# Patient Record
(demographics unavailable — no encounter records)

---

## 2017-08-27 NOTE — EMERGENCY DEPARTMENT REPORT
Entered by REGIS SANTIAGO, acting as scribe for PEDRO JONES NP.





- General


Chief complaint: Skin Rash


Stated complaint: SCALP IRRATATION


Time Seen by Provider: 08/27/17 13:00


Source: patient


Mode of arrival: Ambulatory


Limitations: No Limitations





- History of Present Illness


Initial comments: 





This is a 50-year-old nontoxic, well nourished in appearance, no acute signs of 

distress presents with scalp irritation intermittent for 3 years. Patient 

states she noticed fungus in her scalp and went to a dermatologists and was 

prescribed topical fungal cream with unchanged symptoms. Patient was suppose to 

follow-up with the dermatologist but lost her insurance and did not follow-up.  

She states she feels lumps and whelps in her scalp.  Patient also has a 

secondary complaint of headache intermittent for 3 years. Patient stated has 

been going to [facility] emergency rooms and [facility] providers but never 

received a scan of the head. Patient is requesting a CT scan to rule out any 

abnormality.  Patient described headache has intermittent aching diffisuly with 

level of 6/10. Patient describes headache as gradual onset. Patient denies 

chest pain, SOB, nausea, vomiting, blurry vision, trauma, back, nausea, vomiting

, fever, chills, numbness or tingling. Patient states allergies she does not 

have any allergies to Flagyl. Patient stated she started to vomit during taking 

Flagyl but staetd he was not from Flagyl and was from Iron medication. 


MD complaint: other (scalp irritation and headache)


-: Gradual, year(s) (3)


Location: head


Severity: moderate


Severity scale (0 -10): 6


Quality: aching


Consistency: intermittent


Improves with: none


Worsens with: none


Context: none


Associated symptoms: itching


Treatments Prior to Arrival: none





- Related Data


 Previous Rx's











 Medication  Instructions  Recorded  Last Taken  Type


 


Permethrin 5% [Acticin 5% CREAM] 1 applicatio TP ONCE #60 gram 12/20/13 06/16/ 14 Rx


 


Cefuroxime Axetil [Ceftin] 500 mg PO Q12H #20 tablet 07/11/14 Unknown Rx


 


Pantoprazole [Protonix TAB] 40 mg PO DAILY #30 tablet 07/11/14 Unknown Rx


 


Sucralfate [Carafate] 1 gm PO ACHS #30 tablet 07/11/14 Unknown Rx


 


Warfarin [Coumadin] 6 mg PO DAILY@1700 #30 tablet 07/11/14 Unknown Rx


 


oxyCODONE /ACETAMINOPHEN [Percocet 1 tab PO Q6HR PRN #24 tablet 07/11/14 

Unknown Rx





5/325]    


 


Ibuprofen [Motrin 600 MG tab] 600 mg PO Q8H PRN #30 tablet 08/27/17 Unknown Rx


 


Itraconazole [Onmel] 200 mg PO QDAY #7 tab 08/27/17 Unknown Rx











 Allergies











Allergy/AdvReac Type Severity Reaction Status Date / Time


 


metronidazole [From Flagyl] Allergy  Vomiting Verified 06/28/14 18:59


 


Metronidazole HCl Allergy  Vomiting Verified 10/20/13 04:26





[From Flagyl]     














Abscess Boil HPI





- HPI


Chief Complaint: Skin Rash


Stated Complaint: SCALP IRRATATION


Home Medications: 


 Previous Rx's











 Medication  Instructions  Recorded  Last Taken  Type


 


Permethrin 5% [Acticin 5% CREAM] 1 applicatio TP ONCE #60 gram 12/20/13 06/16/ 14 Rx


 


Cefuroxime Axetil [Ceftin] 500 mg PO Q12H #20 tablet 07/11/14 Unknown Rx


 


Pantoprazole [Protonix TAB] 40 mg PO DAILY #30 tablet 07/11/14 Unknown Rx


 


Sucralfate [Carafate] 1 gm PO ACHS #30 tablet 07/11/14 Unknown Rx


 


Warfarin [Coumadin] 6 mg PO DAILY@1700 #30 tablet 07/11/14 Unknown Rx


 


oxyCODONE /ACETAMINOPHEN [Percocet 1 tab PO Q6HR PRN #24 tablet 07/11/14 

Unknown Rx





5/325]    


 


Ibuprofen [Motrin 600 MG tab] 600 mg PO Q8H PRN #30 tablet 08/27/17 Unknown Rx


 


Itraconazole [Onmel] 200 mg PO QDAY #7 tab 08/27/17 Unknown Rx











Allergies/Adverse Reactions: 


 Allergies











Allergy/AdvReac Type Severity Reaction Status Date / Time


 


metronidazole [From Flagyl] Allergy  Vomiting Verified 06/28/14 18:59


 


Metronidazole HCl Allergy  Vomiting Verified 10/20/13 04:26





[From Flagyl]     














ED Review of Systems


Comment: All other systems reviewed and negative


Constitutional: denies: chills, fever, weakness


Eyes: denies: eye pain, eye discharge, vision change


ENT: denies: ear pain, throat pain


Respiratory: denies: cough, shortness of breath, wheezing


Cardiovascular: denies: chest pain, palpitations


Endocrine: no symptoms reported


Gastrointestinal: denies: abdominal pain, nausea, vomiting, diarrhea


Genitourinary: denies: urgency, dysuria, discharge


Musculoskeletal: denies: back pain, joint swelling, arthralgia


Skin: rash (scalp irritation with circular crusting and scaling.).  denies: 

lesions, change in color, change in hair/nails


Neurological: headache.  denies: weakness, numbness, paresthesias


Psychiatric: denies: anxiety, depression


Hematological/Lymphatic: denies: easy bleeding, easy bruising





ED Past Medical Hx





- Past Medical History


Hx Hypertension: No


Hx Congestive Heart Failure: No


Hx Diabetes: No


Hx Deep Vein Thrombosis: Yes


Hx Pulmonary Embolism: Yes


Hx Liver Disease: No


Hx Renal Disease: No


Hx Seizures: No


Hx Asthma: No


Hx COPD: No


Additional medical history: DVT





- Surgical History


Additional Surgical History: csection tubal ligation





- Social History


Smoking Status: Never Smoker


Substance Use Type: None





- Medications


Home Medications: 


 Home Medications











 Medication  Instructions  Recorded  Confirmed  Last Taken  Type


 


Permethrin 5% [Acticin 5% CREAM] 1 applicatio TP ONCE #60 gram 12/20/13 06/29/ 14 06/16/14 Rx


 


Cefuroxime Axetil [Ceftin] 500 mg PO Q12H #20 tablet 07/11/14  Unknown Rx


 


Pantoprazole [Protonix TAB] 40 mg PO DAILY #30 tablet 07/11/14  Unknown Rx


 


Sucralfate [Carafate] 1 gm PO ACHS #30 tablet 07/11/14  Unknown Rx


 


Warfarin [Coumadin] 6 mg PO DAILY@1700 #30 tablet 07/11/14  Unknown Rx


 


oxyCODONE /ACETAMINOPHEN [Percocet 1 tab PO Q6HR PRN #24 tablet 07/11/14  

Unknown Rx





5/325]     


 


Ibuprofen [Motrin 600 MG tab] 600 mg PO Q8H PRN #30 tablet 08/27/17  Unknown Rx


 


Itraconazole [Onmel] 200 mg PO QDAY #7 tab 08/27/17  Unknown Rx














ED Physical Exam





- General


Limitations: No Limitations


General appearance: alert, in no apparent distress





- Head


Head exam: Present: atraumatic, normocephalic, normal inspection





- Eye


Eye exam: Present: normal appearance, PERRL, EOMI.  Absent: scleral icterus, 

conjunctival injection, nystagmus, periorbital swelling, periorbital tenderness


Pupils: Present: normal accommodation





- ENT


ENT exam: Present: normal exam, normal orophraynx, mucous membranes moist, TM's 

normal bilaterally, normal external ear exam





- Neck


Neck exam: Present: normal inspection, full ROM.  Absent: tenderness, 

meningismus, lymphadenopathy, thyromegaly





- Respiratory


Respiratory exam: Present: normal lung sounds bilaterally.  Absent: respiratory 

distress, wheezes, rales, rhonchi, stridor, chest wall tenderness, accessory 

muscle use, decreased breath sounds, prolonged expiratory





- Cardiovascular


Cardiovascular Exam: Present: regular rate, normal rhythm, normal heart sounds.

  Absent: bradycardia, tachycardia, irregular rhythm, systolic murmur, 

diastolic murmur, rubs, gallop





- GI/Abdominal


GI/Abdominal exam: Present: soft, normal bowel sounds.  Absent: distended, 

tenderness, guarding, rebound, rigid, diminished bowel sounds





- Rectal


Rectal exam: Present: deferred





- Extremities Exam


Extremities exam: Present: normal inspection, full ROM, normal capillary 

refill.  Absent: tenderness, pedal edema, joint swelling, calf tenderness





- Back Exam


Back exam: Present: normal inspection, full ROM.  Absent: tenderness, CVA 

tenderness (L), muscle spasm, paraspinal tenderness, vertebral tenderness, rash 

noted





- Neurological Exam


Neurological exam: Present: alert, oriented X3, CN II-XII intact, normal gait, 

reflexes normal





- Expanded Neurological Exam


  ** Expanded


Patient oriented to: Present: person, place, time


Speech: Present: fluid speech


Cranial nerves: EOM's Intact: Normal, Gag Reflex: Normal, Tongue Deviation: 

Normal, Nystagmus: Normal, Facial Sensation: Normal, Facial Palsy with Forehead 

Movement: Normal, Facial Palsy without Forehead Movement: Normal


Cerebellar function: Finger to Nose: Normal, Heel to Shin: Normal, Romberg: 

Normal


Upper motor neuron: Ron Neglect: Normal, Pronator Drift: Normal, Babinski Sign

: Normal, Sensory Extinction: Normal


Sensory exam: Upper Extremity Light Touch: Normal, Upper Extremity Pin Prick: 

Normal, Upper Extremity Temperature: Normal, UE 2 Point Discrimination: Normal, 

Lower Extremity Light Touch: Normal, Lower Extremity Pin Prick: Normal, Lower 

Extremity Temperature: Normal, LE 2 Point Discrimination: Normal


Motor strength exam: RUE: 5, LUE: 5, RLE: 5, LLE: 5


DTR: bicep (R): 2+, bicep (L): 2+, tricep (R): 2+, tricep (L): 2+, knee (R): 2+

, knee (L): 2+, ankle (R): 2+, ankle (L): 2+


Best Eye Response (Zainab): (4) open spontaneously


Best Motor Response (Zainab): (6) obeys commands


Best Verbal Response (Zainab): (5) oriented


Zainab Total: 15





- Psychiatric


Psychiatric exam: Present: normal affect, normal mood





- Skin


Skin exam: Present: warm, dry, intact, normal color, other (scalp irritation 

with circular crusting, redness, and scaling.)





ED Course


 Vital Signs











  08/27/17





  11:58


 


Temperature 98.1 F


 


Pulse Rate 61


 


Respiratory 18





Rate 


 


Blood Pressure 149/100


 


O2 Sat by Pulse 100





Oximetry 














- Reevaluation(s)


Reevaluation #1: 





08/27/17 13:59


patient is speaking in full sentences with no signs of distress noted.





ED Medical Decision Making





- Medical Decision Making





50-year-old female that presents with tinea corporis and chronic headache.  

Patient requested for a CT scan.  CT scan of head/brain has been obtained 

without contrast with normal findings and no abnormalities.  Dictated by 

radiologist.  Patient was notified of CT findings with no further questions 

noted by the patient.  Patient be treated with itraconazole at discharge.  

Patient was instructed to follow-up with a primary care doctor/neurologist 3-5 

days or symptoms worsen and continue return to emergency room as soon as 

possible.  Patient is also instructed not to drink any alcohol while taking 

antibiotics.





ED Disposition


Clinical Impression: 


 Tinea corporis





Chronic headache


Qualifiers:


 Headache type: unspecified Intractability: not intractable Qualified Code(s): 

R51 - Headache





Disposition: DC-01 TO HOME OR SELFCARE


Is pt being admited?: No


Does the pt Need Aspirin: No


Condition: Stable


Instructions:  Acute Headache (ED), Ibuprofen (By mouth), Itraconazole (By mouth

), Tinea Corporis (ED)


Additional Instructions: 


follow-up with a primary care doctor/neurologist 3-5 days or symptoms worsen 

and continue return to emergency room as soon as possible.


Do not consume any alcohol while taking antibiotics.


Prescriptions: 


Ibuprofen [Motrin 600 MG tab] 600 mg PO Q8H PRN #30 tablet


 PRN Reason: Pain


Itraconazole [Onmel] 200 mg PO QDAY #7 tab


Referrals: 


PRIMARY CARE,MD [Primary Care Provider] - 3-5 Days


KEATON WOODS MD [Staff Physician] - 3-5 Days


[facility] [Outside] - 3-5 Days


[facility] [Outside] - 3-5 Days


FAUSTINO HAQUE MD [Staff Physician] - 3-5 Days


Forms:  Work/School Release Form(ED)





This documentation as recorded by the PAMELA ballard PEARL,accurately reflects 

the service I personally performed and the decisions made by me,PEDRO JONES, 

NP.

## 2017-08-27 NOTE — CAT SCAN REPORT
FINAL REPORT



PROCEDURE:  CT HEAD/BRAIN WO CON



TECHNIQUE:  Computerized tomography of the head was performed

without contrast material. 



HISTORY:  headache 



COMPARISON:  No prior studies are available for comparison.



FINDINGS:  

Brain: Brain density appears normal.  No evidence of intracranial

hemorrhage.  No parenchymal hemorrhage, mass lesions or mass

effect are seen. No abnormal extraxial fluid collects or masses

are seen. Nonspecific mineralization of the right and left basal

ganglia are visualized. 



Ventricles: Ventricles are normal size and are midline. 



Bone Windows:  No evidence of skull fracture.



Paranasal sinuses: Visualized portions appear clear.



Mastoid air cells: Clear



IMPRESSION:  

Negative exam.

## 2018-02-07 NOTE — EMERGENCY DEPARTMENT REPORT
HPI





- General


Chief Complaint: Skin Rash


Time Seen by Provider: 02/07/18 12:24





- HPI


HPI: 





Ms. Orr presents with scalp irritation and eye watering.  She feels a 

crawling sensation in her scalp.  She has hives on her extremities.  Symptoms 

have existed for several months.





Denies throat swelling or dyspnea.





ED Past Medical Hx





- Past Medical History


Previous Medical History?: Yes


Hx Hypertension: No


Hx Congestive Heart Failure: No


Hx Diabetes: No


Hx Deep Vein Thrombosis: Yes


Hx Pulmonary Embolism: Yes


Hx Liver Disease: No


Hx Renal Disease: No


Hx Seizures: No


Hx Asthma: No


Hx COPD: No


Additional medical history: DVT





- Surgical History


Past Surgical History?: Yes


Additional Surgical History: csection.  tubal ligation





- Social History


Smoking Status: Never Smoker


Substance Use Type: None





- Medications


Home Medications: 


 Home Medications











 Medication  Instructions  Recorded  Confirmed  Last Taken  Type


 


Permethrin 5% [Acticin 5% CREAM] 1 applicatio TP ONCE #60 gram 12/20/13 06/29/ 14 06/16/14 Rx


 


Cefuroxime Axetil [Ceftin] 500 mg PO Q12H #20 tablet 07/11/14  Unknown Rx


 


Pantoprazole [Protonix TAB] 40 mg PO DAILY #30 tablet 07/11/14  Unknown Rx


 


Sucralfate [Carafate] 1 gm PO ACHS #30 tablet 07/11/14  Unknown Rx


 


Warfarin [Coumadin] 6 mg PO DAILY@1700 #30 tablet 07/11/14  Unknown Rx


 


oxyCODONE /ACETAMINOPHEN [Percocet 1 tab PO Q6HR PRN #24 tablet 07/11/14  

Unknown Rx





5/325]     


 


Ibuprofen [Motrin 600 MG tab] 600 mg PO Q8H PRN #30 tablet 08/27/17  Unknown Rx


 


Itraconazole [Onmel] 200 mg PO QDAY #7 tab 08/27/17  Unknown Rx


 


Ketoconazole (Nf) [Ketoconazole 120 ml TP 1XW #1 shampoo 02/07/18  Unknown Rx





Shampoo (Nf)]     


 


predniSONE [Deltasone] 60 mg PO QDAY 5 Days #15 tab 02/07/18  Unknown Rx














ED Review of Systems


ROS: 


Stated complaint: SKIN IRRITATION


Other details as noted in HPI





Comment: All other systems reviewed and negative


Constitutional: denies: fever, malaise


Respiratory: denies: cough


Cardiovascular: denies: chest pain





Physical Exam





- Physical Exam


Vital Signs: 


 Vital Signs











  02/07/18





  09:21


 


Temperature 98.7 F


 


Pulse Rate 60


 


Respiratory 18





Rate 


 


Blood Pressure 133/80


 


O2 Sat by Pulse 98





Oximetry 











Physical Exam: 





General:  Well-appearing, no acute distress


HEENT: Normocephalic atraumatic pupils equal round and reactive to light 

anicteric sclera 


  Nose: no rhinorrhea


  Oropharynx: Clear mucous membranes no lesions


scalp:  moist hair without lesions or infestation


Neck: supple, no meningismus


Chest: Clear to auscultation bilaterally no rales rhonchi no wheezes


Cardiac: Regular rate and rhythm no murmurs no rubs no gallops


Abdomen: Soft nontender nondistended positive bowel sounds no guarding


Extremities: No cyanosis no clubbing no edema


Neuro: Moves all extremities 4, no gross deficits


Psychiatric: Alert and oriented 4 normal  affect normal judgment normal insight


Skin: mild redness upper extremities





ED Course


 Vital Signs











  02/07/18





  09:21


 


Temperature 98.7 F


 


Pulse Rate 60


 


Respiratory 18





Rate 


 


Blood Pressure 133/80


 


O2 Sat by Pulse 98





Oximetry 














ED Medical Decision Making





- Medical Decision Making





Ms. Orr presents with scalp irritation, watery eyes and skin redness.





I reviewed medical record.  She has hx of scabies and delusions of parasites.





I do not suspect acute psychosis.  She does not appear to be a harm to herself 

or others.





I will prescribe prednisone and antifungal shampoo.








Critical care attestation.: 


If time is entered above; I have spent that time in minutes in the direct care 

of this critically ill patient, excluding procedure time.








ED Disposition


Clinical Impression: 


 Scalp irritation, Dermatitis





Disposition: DC-01 TO HOME OR SELFCARE


Is pt being admited?: No


Does the pt Need Aspirin: No


Condition: Good


Instructions:  Contact Dermatitis (ED)


Prescriptions: 


Ketoconazole (Nf) [Ketoconazole Shampoo (Nf)] 120 ml TP 1XW #1 shampoo


predniSONE [Deltasone] 60 mg PO QDAY 5 Days #15 tab


Referrals: 


PRIMARY CARE,MD [Primary Care Provider] - 3-5 Days


Time of Disposition: 12:33

## 2018-09-06 NOTE — CAT SCAN REPORT
FINAL REPORT



PROCEDURE:  CT angiogram chest with contrast. 



TECHNIQUE:  Computerized tomographic angiography of the chest was

performed after the IV injection of iodinated nonionic contrast

including image processing. The image data was postprocessed

using 2-dimensional multiplanar reformatted (MPR) and

3-dimensional (MIP and/or volume rendered) techniques. 



HISTORY:  sob, DVT. Looking for PE 



COMPARISON:  No prior studies are available for comparison.



FINDINGS:  

The trachea and central bronchi appear normal. The lungs are

clear and well expanded. There are no signs of pneumonia. The

thoracic aorta has a normal caliber without evidence of

dissection. The bolus enhancement of the pulmonary arteries is

suboptimal. The central pulmonary arteries and 1st order branches

appear normal. This study could miss more peripheral emboli.

There is no mediastinal adenopathy. The heart size is normal.

There are no pleural effusions. The adrenal glands are not

enlarged. The thoracic skeleton appears intact. 



IMPRESSION:  

Less than ideal evaluation of the pulmonary arteries. No evidence

of pulmonary embolism.

## 2018-09-06 NOTE — EMERGENCY DEPARTMENT REPORT
ED Extremity Problem HPI





- General


Chief complaint: Pain General


Stated complaint: SWELLING ON BOTH LEGS/BLOOD CLOTS


Time Seen by Provider: 18 12:42


Source: patient


Mode of arrival: Ambulatory


Limitations: Physical Limitation





- History of Present Illness


Initial comments: 





Patient presents to the emergency department with a chief complaint of 

bilateral leg pain as well as chest pain.  Patient states she was diagnosed 

with a DVT of her left leg at another hospital was not giving anticoagulation 

medications.  Patient states she has a history of blood clots of her lungs and 

legs.  Patient also states that she is short of breath with exertion is 

concerned that she might have a pulmonary emboli as well.  Patient reports that 

the pain is 10/10, worse in left leg and right and she is having difficulty 

walking.  She reports pain is crampy  and sharp and she has taken medication 

but it is not helping.  She said swelling to her left leg is getting worse.  No 

alleviating factors but exacerbated by walking, palpation.  She denies any 

shortness of breath.  Denies any cough, nausea or vomiting or fever or chills.  

She says she was not Coumadin which did not occur off for years ago.  She says 

she also has IVC filter and which got lost in her body and that was placed over 

20 years ago.  Patient denies any history of clotting disorders.  She does not 

have access to healthcare at present.  She denies having a primary care doctor.

  Denies any abdominal or back pain.


MD Complaint: extremity pain, extremity swelling


Onset/Timin


-: week(s)


Location: bilateral lower extremity


History of Same: Yes


-: Yes myalgia, Yes arthralgia, No fever, No associated dyspnea, Yes associated 

chest pain


Radiation: none


Severity scale (0 -10): 10


Quality: aching, sharp, constant


Improves with: nothing


Worsens with: weight bearing, walking, exertion, palpation


Associated Symptoms: chest pain, myalgias, arthralgias.  denies: shortness of 

breath, fever, rash





- Related Data


 Previous Rx's











 Medication  Instructions  Recorded  Last Taken  Type


 


Permethrin 5% [Acticin 5% CREAM] 1 applicatio TP ONCE #60 gram 13 Rx


 


Cefuroxime Axetil [Ceftin] 500 mg PO Q12H #20 tablet 14 Unknown Rx


 


Pantoprazole [Protonix TAB] 40 mg PO DAILY #30 tablet 14 Unknown Rx


 


Sucralfate [Carafate] 1 gm PO ACHS #30 tablet 14 Unknown Rx


 


Warfarin [Coumadin] 6 mg PO DAILY@1700 #30 tablet 14 Unknown Rx


 


Ibuprofen [Motrin 600 MG tab] 600 mg PO Q8H PRN #30 tablet 17 Unknown Rx


 


Itraconazole [Onmel] 200 mg PO QDAY #7 tab 17 Unknown Rx


 


Ketoconazole (Nf) [Ketoconazole 120 ml TP 1XW #1 shampoo 18 Unknown Rx





Shampoo (Nf)]    


 


predniSONE [Deltasone] 60 mg PO QDAY 5 Days #15 tab 18 Unknown Rx


 


Apixaban [Eliquis] 5 mg PO Q12H 14 Days #74 tablet 18 Unknown Rx


 


oxyCODONE /ACETAMINOPHEN [Percocet 1 tab PO Q6HR PRN #12 tablet 18 

Unknown Rx





5/325 mg]    











 Allergies











Allergy/AdvReac Type Severity Reaction Status Date / Time


 


metronidazole [From Flagyl] Allergy  Vomiting Verified 14 18:59


 


Metronidazole HCl Allergy  Vomiting Verified 10/20/13 04:26





[From Flagyl]     














ED Review of Systems


ROS: 


Stated complaint: SWELLING ON BOTH LEGS/BLOOD CLOTS


Other details as noted in HPI





Constitutional: denies: chills, fever


Eyes: denies: eye pain, eye discharge, vision change


ENT: denies: ear pain, throat pain, congestion


Respiratory: denies: cough, shortness of breath, SOB with exertion, SOB at rest

, stridor, wheezing


Cardiovascular: chest pain, edema.  denies: palpitations, dyspnea on exertion, 

syncope


Gastrointestinal: denies: abdominal pain, nausea, vomiting, diarrhea, 

hematemesis, hematochezia


Genitourinary: denies: urgency, dysuria, hematuria, discharge


Musculoskeletal: arthralgia, myalgia, other (swelling to both legs).  denies: 

back pain, joint swelling


Skin: denies: rash, lesions


Neurological: denies: headache, weakness, numbness, paresthesias, confusion, 

abnormal gait, vertigo





ED Past Medical Hx





- Past Medical History


Previous Medical History?: Yes


Hx Hypertension: No


Hx Congestive Heart Failure: No


Hx Diabetes: No


Hx Deep Vein Thrombosis: Yes


Hx Pulmonary Embolism: Yes


Hx Liver Disease: No


Hx Renal Disease: No


Hx Seizures: No


Hx Asthma: No


Hx COPD: No


Additional medical history: DVT





- Surgical History


Past Surgical History?: Yes


Additional Surgical History: csection.  tubal ligation.  IVC filter 20 years 

ago that got lost in her body





- Family History


Family history: hypertension





- Social History


Smoking Status: Never Smoker


Substance Use Type: None





- Medications


Home Medications: 


 Home Medications











 Medication  Instructions  Recorded  Confirmed  Last Taken  Type


 


Permethrin 5% [Acticin 5% CREAM] 1 applicatio TP ONCE #60 gram 13 Rx


 


Cefuroxime Axetil [Ceftin] 500 mg PO Q12H #20 tablet 14  Unknown Rx


 


Pantoprazole [Protonix TAB] 40 mg PO DAILY #30 tablet 14  Unknown Rx


 


Sucralfate [Carafate] 1 gm PO ACHS #30 tablet 14  Unknown Rx


 


Warfarin [Coumadin] 6 mg PO DAILY@1700 #30 tablet 14  Unknown Rx


 


Ibuprofen [Motrin 600 MG tab] 600 mg PO Q8H PRN #30 tablet 17  Unknown Rx


 


Itraconazole [Onmel] 200 mg PO QDAY #7 tab 17  Unknown Rx


 


Ketoconazole (Nf) [Ketoconazole 120 ml TP 1XW #1 shampoo 18  Unknown Rx





Shampoo (Nf)]     


 


predniSONE [Deltasone] 60 mg PO QDAY 5 Days #15 tab 18  Unknown Rx


 


Apixaban [Eliquis] 5 mg PO Q12H 14 Days #74 tablet 18  Unknown Rx


 


oxyCODONE /ACETAMINOPHEN [Percocet 1 tab PO Q6HR PRN #12 tablet 18  

Unknown Rx





5/325 mg]     














ED Physical Exam





- General


Limitations: Physical Limitation


General appearance: alert, in no apparent distress





- Head


Head exam: Present: atraumatic, normocephalic, normal inspection





- Eye


Eye exam: Present: normal appearance, PERRL, EOMI


Pupils: Present: normal accommodation





- ENT


ENT exam: Present: normal exam, normal orophraynx, mucous membranes moist, TM's 

normal bilaterally, normal external ear exam





- Neck


Neck exam: Present: normal inspection, full ROM.  Absent: tenderness, 

lymphadenopathy





- Respiratory


Respiratory exam: Present: normal lung sounds bilaterally.  Absent: respiratory 

distress, wheezes, rales, rhonchi, stridor, chest wall tenderness





- Cardiovascular


Cardiovascular Exam: Present: regular rate, normal rhythm, normal heart sounds.

  Absent: systolic murmur, diastolic murmur





- GI/Abdominal


GI/Abdominal exam: Present: soft, normal bowel sounds.  Absent: distended, 

tenderness, guarding, rebound, rigid, organomegaly, mass





- Extremities Exam


Extremities exam: Present: full ROM, tenderness (both legs), normal capillary 

refill, other.  Absent: normal inspection, pedal edema (patient able to 

ambulate but she is limping patient states that she is having pain which is 

worse on the left), joint swelling, calf tenderness





- Expanded Lower Extremity Exam


  ** Left


Hip exam: Present: normal inspection, full ROM, pelvic stability.  Absent: 

tenderness, swelling, abrasion, laceration, ecchymosis, deformity, crepidus, 

dislocation, erythema, external rotation, internal rotation, shortening


Upper Leg exam: Present: normal inspection, full ROM.  Absent: tenderness, 

swelling, abrasion, laceration, ecchymosis, deformity, crepidus, dislocation, 

erythema


Knee exam: Present: full ROM, tenderness (posterior popliteal space), full knee 

extension.  Absent: normal inspection, swelling, abrasion, laceration, 

ecchymosis, deformity, crepidus, dislocation, erythema, effusion, pain w/ 

pronation/supination, posterior draw sign, pain/laxity with valgus, pain/laxity 

with varus


Lower Leg exam: Present: full ROM, tenderness, swelling, Leonel's sign.  Absent: 

normal inspection, abrasion, laceration, ecchymosis, deformity, crepidus, 

dislocation, erythema, palpable cord


Ankle exam: Present: normal inspection, full ROM.  Absent: tenderness, swelling

, abrasion, laceration, ecchymosis, deformity, crepidus, dislocation, erythema


Foot/Toe exam: Present: normal inspection, full ROM.  Absent: tenderness, 

swelling, abrasion, laceration, ecchymosis, deformity, crepidus, dislocation, 

erythema, amputation, puncture wound, foreign body, calcaneal tenderness, 

tenderness at base of 5th metatarsal, nail avulsion, subungual hematoma


Neuro vascular tendon exam: Present: no vascular compromise, significant pain 

with passive ROM of distal joint.  Absent: pulse deficit, abnormal cap refill, 

motor deficit, sensory deficit, tendon deficit, extremity cold to touch, pallor

, abnormal 2-point discrimination, decreased fine/light touch, foot drop, 

peroneal nerve deficit


Gait: Positive: antalgic





  ** Right


Hip exam: Present: normal inspection, full ROM, pelvic stability.  Absent: 

tenderness, swelling, abrasion, laceration, ecchymosis, deformity, crepidus, 

dislocation, erythema, external rotation, internal rotation, shortening


Upper Leg exam: Present: normal inspection, full ROM.  Absent: tenderness, 

swelling, abrasion, laceration, ecchymosis, deformity, crepidus, dislocation, 

erythema


Knee exam: Present: normal inspection, full ROM, full knee extension.  Absent: 

tenderness, swelling, abrasion, laceration, ecchymosis, deformity, crepidus, 

dislocation, erythema, effusion, pain w/ pronation/supination, posterior draw 

sign, pain/laxity with valgus, pain/laxity with varus


Lower Leg exam: Present: full ROM, tenderness, swelling, Leonel's sign.  Absent: 

normal inspection, abrasion, laceration, ecchymosis, deformity, crepidus, 

dislocation, erythema, palpable cord


Ankle exam: Present: normal inspection, full ROM.  Absent: tenderness, swelling

, abrasion, laceration, ecchymosis, deformity, crepidus, dislocation, erythema


Foot/Toe exam: Present: normal inspection, full ROM.  Absent: tenderness, 

swelling, abrasion, laceration, ecchymosis, deformity, crepidus, dislocation, 

erythema, amputation, puncture wound, foreign body, calcaneal tenderness, 

tenderness at base of 5th metatarsal, nail avulsion, subungual hematoma


Neuro vascular tendon exam: Present: no vascular compromise.  Absent: pulse 

deficit, abnormal cap refill, motor deficit, sensory deficit, tendon deficit, 

extremity cold to touch, abnormal 2-point discrimination, decreased fine/light 

touch, foot drop, significant pain with passive ROM of distal joint


Gait: Positive: observed and limited by pain





- Back Exam


Back exam: Present: normal inspection, full ROM, other (ambulate with limp.  

Due to bilateral lower extremity pain).  Absent: tenderness, CVA tenderness (R)

, CVA tenderness (L), muscle spasm, paraspinal tenderness, vertebral tenderness

, rash noted





- Neurological Exam


Neurological exam: Present: alert, oriented X3, abnormal gait (limp.  Due to 

bilateral lower extremity pain and swelling to lower extremity), reflexes 

normal.  Absent: motor sensory deficit





- Psychiatric


Psychiatric exam: Present: normal affect, normal mood





- Skin


Skin exam: Present: warm, dry, intact, normal color.  Absent: rash





ED Course


 Vital Signs











  18





  10:26 14:24 15:40


 


Temperature 99.9 F H  


 


Pulse Rate 91 H  


 


Respiratory  18 18





Rate   


 


Blood Pressure 134/88  


 


Blood Pressure   





[Left]   


 


O2 Sat by Pulse 99 99 





Oximetry   














  18





  15:56 16:25 16:35


 


Temperature   


 


Pulse Rate   


 


Respiratory 18 18 18





Rate   


 


Blood Pressure   


 


Blood Pressure   





[Left]   


 


O2 Sat by Pulse   





Oximetry   














  18





  19:55


 


Temperature 98.6 F


 


Pulse Rate 62


 


Respiratory 18





Rate 


 


Blood Pressure 


 


Blood Pressure 125/68





[Left] 


 


O2 Sat by Pulse 99





Oximetry 














- Reevaluation(s)


Reevaluation #1: 





18 16:22


Patient received morphine 4 mg IV for pain and offered 4 mg IV for pain and 

bilateral lower extremity especially to left lower extremity.  She has 

bilateral lower extremity chronic DVT with left lower extremity acute on 

chronic DVT.  Patient is requesting more pain medication she said her left leg 

is hurting.  Patient and ordered VQ scan due to shortness of breath looking for 

pulmonary embolism and she is awaiting study.














Reevaluation #2: 





18 18:14


Patient received an additional 2 mg of morphine IV for left leg pain which she 

said her pain is better.  CTA chest reveals no pulmonary embolism














Reevaluation #3: 





18 19:49


Patient is still awaiting an her Eliquis to come up from pharmacy.  She 

received morphine 2 mg but her pain is back so she will be given Percocet 5/3-

52 tablets by mouth.  After a look with administration, she will be observed 

for 2 hours and then she will be going home.  Patient updated on plans.


18 19:50





Reevaluation #4: 





18 20:52


Patient started on Eliquis and will be discharged and 2 hours.  Vital signs are 

stable and she is afebrile.  Pain is better after Percocet








Reevaluation #5: 





18 22:17


Patient is stable and in no acute distress.  Pain is controlled.  Vital signs 

stable she is afebrile





ED Medical Decision Making





- Lab Data


Result diagrams: 


 18 13:31





 18 13:31








 Lab Results











  18 Range/Units





  13:31 13:31 13:31 


 


WBC  7.8    (4.5-11.0)  K/mm3


 


RBC  4.67    (3.65-5.03)  M/mm3


 


Hgb  13.9    (10.1-14.3)  gm/dl


 


Hct  41.3    (30.3-42.9)  %


 


MCV  88    (79-97)  fl


 


MCH  30    (28-32)  pg


 


MCHC  34    (30-34)  %


 


RDW  12.3 L    (13.2-15.2)  %


 


Plt Count  164    (140-440)  K/mm3


 


Lymph % (Auto)  25.5    (13.4-35.0)  %


 


Mono % (Auto)  9.2 H    (0.0-7.3)  %


 


Eos % (Auto)  3.7    (0.0-4.3)  %


 


Baso % (Auto)  1.2    (0.0-1.8)  %


 


Lymph #  2.0    (1.2-5.4)  K/mm3


 


Mono #  0.7    (0.0-0.8)  K/mm3


 


Eos #  0.3    (0.0-0.4)  K/mm3


 


Baso #  0.1    (0.0-0.1)  K/mm3


 


Seg Neutrophils %  60.4    (40.0-70.0)  %


 


Seg Neutrophils #  4.7    (1.8-7.7)  K/mm3


 


PT     (12.2-14.9)  Sec.


 


INR     (0.87-1.13)  


 


APTT     (24.2-36.6)  Sec.


 


Sodium   141   (137-145)  mmol/L


 


Potassium   3.9   (3.6-5.0)  mmol/L


 


Chloride   102.2   ()  mmol/L


 


Carbon Dioxide   28   (22-30)  mmol/L


 


Anion Gap   15   mmol/L


 


BUN   10   (7-17)  mg/dL


 


Creatinine   0.6 L   (0.7-1.2)  mg/dL


 


Estimated GFR   > 60   ml/min


 


BUN/Creatinine Ratio   17   %


 


Glucose   99   ()  mg/dL


 


Calcium   9.4   (8.4-10.2)  mg/dL


 


Total Bilirubin   0.30   (0.1-1.2)  mg/dL


 


AST   42 H   (5-40)  units/L


 


ALT   26   (7-56)  units/L


 


Alkaline Phosphatase   85   ()  units/L


 


Troponin T    < 0.010  (0.00-0.029)  ng/mL


 


NT-Pro-B Natriuret Pep    149.6  (0-900)  pg/mL


 


Total Protein   7.8   (6.3-8.2)  g/dL


 


Albumin   4.3   (3.9-5)  g/dL


 


Albumin/Globulin Ratio   1.2   %


 


HCG, Qual     (Negative)  


 


HCG, Quant     (0-4)  mIU/mL














  18 Range/Units





  13:31 15:17 15:17 


 


WBC     (4.5-11.0)  K/mm3


 


RBC     (3.65-5.03)  M/mm3


 


Hgb     (10.1-14.3)  gm/dl


 


Hct     (30.3-42.9)  %


 


MCV     (79-97)  fl


 


MCH     (28-32)  pg


 


MCHC     (30-34)  %


 


RDW     (13.2-15.2)  %


 


Plt Count     (140-440)  K/mm3


 


Lymph % (Auto)     (13.4-35.0)  %


 


Mono % (Auto)     (0.0-7.3)  %


 


Eos % (Auto)     (0.0-4.3)  %


 


Baso % (Auto)     (0.0-1.8)  %


 


Lymph #     (1.2-5.4)  K/mm3


 


Mono #     (0.0-0.8)  K/mm3


 


Eos #     (0.0-0.4)  K/mm3


 


Baso #     (0.0-0.1)  K/mm3


 


Seg Neutrophils %     (40.0-70.0)  %


 


Seg Neutrophils #     (1.8-7.7)  K/mm3


 


PT   12.7   (12.2-14.9)  Sec.


 


INR   0.91   (0.87-1.13)  


 


APTT   23.5 L   (24.2-36.6)  Sec.


 


Sodium     (137-145)  mmol/L


 


Potassium     (3.6-5.0)  mmol/L


 


Chloride     ()  mmol/L


 


Carbon Dioxide     (22-30)  mmol/L


 


Anion Gap     mmol/L


 


BUN     (7-17)  mg/dL


 


Creatinine     (0.7-1.2)  mg/dL


 


Estimated GFR     ml/min


 


BUN/Creatinine Ratio     %


 


Glucose     ()  mg/dL


 


Calcium     (8.4-10.2)  mg/dL


 


Total Bilirubin     (0.1-1.2)  mg/dL


 


AST     (5-40)  units/L


 


ALT     (7-56)  units/L


 


Alkaline Phosphatase     ()  units/L


 


Troponin T    < 0.010  (0.00-0.029)  ng/mL


 


NT-Pro-B Natriuret Pep     (0-900)  pg/mL


 


Total Protein     (6.3-8.2)  g/dL


 


Albumin     (3.9-5)  g/dL


 


Albumin/Globulin Ratio     %


 


HCG, Qual     (Negative)  


 


HCG, Quant  4.26 H    (0-4)  mIU/mL














  18 Range/Units





  15:30 


 


WBC   (4.5-11.0)  K/mm3


 


RBC   (3.65-5.03)  M/mm3


 


Hgb   (10.1-14.3)  gm/dl


 


Hct   (30.3-42.9)  %


 


MCV   (79-97)  fl


 


MCH   (28-32)  pg


 


MCHC   (30-34)  %


 


RDW   (13.2-15.2)  %


 


Plt Count   (140-440)  K/mm3


 


Lymph % (Auto)   (13.4-35.0)  %


 


Mono % (Auto)   (0.0-7.3)  %


 


Eos % (Auto)   (0.0-4.3)  %


 


Baso % (Auto)   (0.0-1.8)  %


 


Lymph #   (1.2-5.4)  K/mm3


 


Mono #   (0.0-0.8)  K/mm3


 


Eos #   (0.0-0.4)  K/mm3


 


Baso #   (0.0-0.1)  K/mm3


 


Seg Neutrophils %   (40.0-70.0)  %


 


Seg Neutrophils #   (1.8-7.7)  K/mm3


 


PT   (12.2-14.9)  Sec.


 


INR   (0.87-1.13)  


 


APTT   (24.2-36.6)  Sec.


 


Sodium   (137-145)  mmol/L


 


Potassium   (3.6-5.0)  mmol/L


 


Chloride   ()  mmol/L


 


Carbon Dioxide   (22-30)  mmol/L


 


Anion Gap   mmol/L


 


BUN   (7-17)  mg/dL


 


Creatinine   (0.7-1.2)  mg/dL


 


Estimated GFR   ml/min


 


BUN/Creatinine Ratio   %


 


Glucose   ()  mg/dL


 


Calcium   (8.4-10.2)  mg/dL


 


Total Bilirubin   (0.1-1.2)  mg/dL


 


AST   (5-40)  units/L


 


ALT   (7-56)  units/L


 


Alkaline Phosphatase   ()  units/L


 


Troponin T   (0.00-0.029)  ng/mL


 


NT-Pro-B Natriuret Pep   (0-900)  pg/mL


 


Total Protein   (6.3-8.2)  g/dL


 


Albumin   (3.9-5)  g/dL


 


Albumin/Globulin Ratio   %


 


HCG, Qual  Negative  (Negative)  


 


HCG, Quant   (0-4)  mIU/mL














- EKG Data


-: EKG Interpreted by Me (Dr. Fermin)


EKG shows normal: sinus rhythm


Rate: bradycardia (55 beats per minutes)





- EKG Data


When compared to previous EKG there are: no significant change


Interpretation: no acute changes, normal EKG





- Radiology Data


Radiology results: report reviewed


Preliminary bilateral lower extremity venous duplex studies report shows 

chronic DVT in both CFV's.  And acute on chronic DVT in left mid saphenous vein 

extending onto the left distal saphenous vein, left popliteal vein and left mid 

popliteal vein and peroneal veins and mid calf.  Final report to be read by 


CTA lungs shows no PE and this was dictated by radiologist and report reviewed 

by myself.


Both


ROSENDO HENAO   Female : 1967  [facility]# [id number]





18 14:41 - Radiology Dept. Note by MARIBETH WALSH


   [facility] Num: [id number]  : 1967  Patient Age: 51





VASCULAR LAB.PRELIMINARY REPORT. BLE VENOUS DUPLEX DONE. EVIDENCE OF CHRONIC 

DVT IN BOTH CFV'S. ACUTE ON CHRONIC DVT IN THE LT.MID SFV EXTENDING DOWN TO THE 

LT.DS SFV,LT.POP V AND LT.MID PTV/PERONEAL VEINS IN MID CALF.  INFORMED 

AT 1440.





Initialized on 18 14:41 - END OF NOTE





Findings


[facility] 


[address] [place] [address] 





Cat Scan Report 


Signed 





Patient: ROSENDO HENAO MR#: [id number] 


: 1967 Acct:[id number] 


Age/Sex: 51 / F ADM Date: 18 


Loc: ED 


Attending Dr: 








Ordering Physician: GURU ANAND 


Date of Service: 18 


Procedure(s): CT angio chest 


Accession Number(s): [id number] 





cc: GURU ANAND 








FINAL REPORT 





PROCEDURE: CT angiogram chest with contrast. 





TECHNIQUE: Computerized tomographic angiography of the chest was 


performed after the IV injection of iodinated nonionic contrast 


including image processing. The image data was postprocessed 


using 2-dimensional multiplanar reformatted (MPR) and 


3-dimensional (MIP and/or volume rendered) techniques. 





HISTORY: sob, DVT. Looking for PE 





COMPARISON: No prior studies are available for comparison. 





FINDINGS: 


The trachea and central bronchi appear normal. The lungs are 


clear and well expanded. There are no signs of pneumonia. The 


thoracic aorta has a normal caliber without evidence of 


dissection. The bolus enhancement of the pulmonary arteries is 


suboptimal. The central pulmonary arteries and 1st order branches 


appear normal. This study could miss more peripheral emboli. 


There is no mediastinal adenopathy. The heart size is normal. 


There are no pleural effusions. The adrenal glands are not 


enlarged. The thoracic skeleton appears intact. 





IMPRESSION: 


Less than ideal evaluation of the pulmonary arteries. No evidence 


of pulmonary embolism. 











Transcribed By: MRM 


Dictated By: NEGRITO WATKINS MD 


Electronically Authenticated By: NEGRITO WATKINS MD 


Signed Date/Time: 18 











DD/DT: 18 


TD/TT: 18








- Medical Decision Making





This is a 51-year-old patient who came in here complaining of swelling to both 

legs and pain and swelling and pain is worst in left leg.  She essentially went 

to a hospital last week and it told that she had a DVT in her left leg but they 

only gave her pain medication and did not give her any blood thinner.  Patient 

says she has a history of DVT in both legs and also pulmonary embolism and she 

is complaining of some chest pain.  She is currently not taking any medication 

and she does not have access to medical care and  she does not have a primary 

care doctor





Patient was screened by Dr. Fermin and orders.  She was treated for pain to her 

legs with morphine a total of 6 mg IV and 4 mg and 2 mg increments which helped 

her pain.  While waiting in for her medication for DVT she developed pain and 

she received Percocet 2 tablets.  Emergency room which helped her pain.  Vital 

signs are stable and she is afebrile.  Patient also received Zofran 4 mg IV to 

prevent nausea.  She has bilateral duplex Doppler ultrasound chronic bilateral 

DVT seen but also acute and chronic DVT seen in left lower leg which is the one 

that is more painful and swollen.  Patient had CTA chest which did not show any 

PE.  I corroborated this to Dr. Fermin and it was decided the patient will be 

discharged home on Eliquis.  Preliminary ultrasound report for DVT seen but 

still awaiting final report from radiologist.  CT at bedtime dictated and 

report reviewed by myself and Dr. Fermin.  Patient had CBC, CMP, BNP, pregnancy 

test, troponin which are all stable.  PT PTT stable with mild elevation in PTT 

I discussed lab results and radiology reports with patient and told her that 

she has an acute DVT on top of chronic DVT in her left leg and she also has 

chronic DVT in right leg.  Patient does not have a primary care doctor nor does 

she have insurance so she was given a liquid 14 day package.  She was given her 

first dose of Eliquis 10 mg at 9 PM and observed for 2 hours and discharged 

home with prescription for Eliquis and Percocet.  I went over her packet in 

detail with patient and how to take a look with and she voiced understanding.  

I discussed with her that she needs to take a dose in the morning and follow 

directions on prescription and in packet on how to take this medication.  I 

also went over medication with her and told her to read discharge information 

on this medication and she voiced understanding.  Patient discharged home in 

stable condition with her family member.  Vital signs stable and her pains 

controlled and she is in no acute distress.  I gave her multiple resources for 

health clinics in [facility] that does sliding scale fees based on income and I 

told her to call around to see who has the least expensive visit.  I told her 

that she will need to follow-up with a primary care doctor on 09/10/2018 and 

they will need to manage her DVT.  She also knows that she has to call the 

number on the package inserts.





- Differential Diagnosis


DVT, PE, edema, CHF, PNA.


Critical care attestation.: 


If time is entered above; I have spent that time in minutes in the direct care 

of this critically ill patient, excluding procedure time.








ED Disposition


Clinical Impression: 


 Swelling of lower extremity





Deep vein thrombosis (DVT) of left lower extremity


Qualifiers:


 Affected thrombotic vein of extremity: unspecified vein of extremity Chronicity

: acute Qualified Code(s): I82.402 - Acute embolism and thrombosis of 

unspecified deep veins of left lower extremity





Chronic deep vein thrombosis (DVT) of both lower extremities


Qualifiers:


 Affected thrombotic vein of extremity: unspecified vein of extremity Qualified 

Code(s): I82.503 - Chronic embolism and thrombosis of unspecified deep veins of 

lower extremity, bilateral





Chest pain


Qualifiers:


 Chest pain type: unspecified Qualified Code(s): R07.9 - Chest pain, unspecified





Disposition: DC- TO HOME OR SELFCARE


Is pt being admited?: No


Does the pt Need Aspirin: No


Condition: Stable


Instructions:  Apixaban (By mouth), Chest Pain (ED), Deep Venous Thrombosis (ED)

, Leg Edema (ED)


Additional Instructions: 


Please follow up with outside Medical Center as discussed for management of 

acute DVT in left leg and chronic DVT in both legs.


Take Elequis as prescribed.  This is a blood thinner.  The discharge 

information given on this medication


If he develops shortness of breath, chest pain, please return to the emergency 

room ASAP


Take Percocet for severe pain in leg but please do not drive or operate heavy 

machinery while taking this medication and make sure you take this medication 

with lots of fiber and drink plenty flutter because it can cause constipation.


Please see multiple referral to clinics that will help few if you do not have 

any health insurance U will need to contact them tomorrow to see who he can get 

an appointment with.


See insert for Eliquis and follow instructions on pamphlet


Also, please see packet inserts for side effects.


See directions on prescription on how to take Eliquis


Prescriptions: 


Apixaban [Eliquis] 5 mg PO Q12H 14 Days #74 tablet


oxyCODONE /ACETAMINOPHEN [Percocet 5/325 mg] 1 tab PO Q6HR PRN #12 tablet


 PRN Reason: severe pain


Referrals: 


[facility] [Outside] - 18


[facility] [Outside] - 18


[facility] [Outside] - 18


[facility] [Outside] - 18


[facility] [Outside] - 18


Forms:  Accompanied Note

## 2018-09-06 NOTE — EMERGENCY DEPARTMENT REPORT
Blank Doc





- Documentation


Documentation: 





Patient presents to the emergency department with a chief complaint of 

bilateral leg pain as well as chest pain.  Patient states she was diagnosed 

with a DVT of her left leg at another hospital was not giving anticoagulation 

medications.  Patient states she has a history of blood clots of her lungs and 

lids.  Patient also states that she is short of breath with exertion is 

concerned that she might have a pulmonary emboli as well.  Care of patient will 

be taken over by the mid-level care provider with me available for consultation
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
## 2020-08-29 NOTE — CAT SCAN REPORT
CTA CHEST WITH IV CONTRAST



INDICATION:

CP, SOB, recent LE fracture, hx of PE.



TECHNIQUE:

Axial CT images were obtained through the chest after injection of 100 cc Omni 350 IV contrast. 3 kristin
ne MIP reconstructions were produced. All CT scans at this location are performed using CT dose reduc
tion for ALARA by means of automated exposure control. 



COMPARISON:

CTA chest 9/6/2018 report. Images not available.



FINDINGS:

PULMONARY ARTERIES: No pulmonary emboli.

THORACIC AORTA: Ectatic 3.6 cm mid ascending thoracic aorta.

HEART: Enlarged

CORONARY ARTERIES: No significant calcification.

PLEURA: No pleural effusion. No pneumothorax.

LYMPH NODES: No significant adenopathy.

LUNGS: No acute air space or interstitial disease. 



ADDITIONAL FINDINGS: None.



UPPER ABDOMEN: No acute findings.



SKELETAL STRUCTURES: No significant osseous abnormality.



IMPRESSION:

1. No CT evidence for pulmonary embolism. 

2. Cardiomegaly without CHF.



Signer Name: Kj Radford MD 

Signed: 8/29/2020 9:33 PM

Workstation Name: [id number]

## 2020-08-29 NOTE — XRAY REPORT
CHEST 1 VIEW 8/29/2020 2:30 PM



INDICATION / CLINICAL INFORMATION:

Chest Pain.



COMPARISON: 

None available.



FINDINGS:



SUPPORT DEVICES: None.



HEART / MEDIASTINUM: No significant abnormality. 



LUNGS / PLEURA: No significant pulmonary or pleural abnormality. No pneumothorax. 



ADDITIONAL FINDINGS: No significant additional findings.



IMPRESSION:

1. No acute findings.



Signer Name: Kj Radford MD 

Signed: 8/29/2020 3:07 PM

Workstation Name: [id number]

## 2020-08-29 NOTE — VASCULAR LAB REPORT
DUPLEX DOPPLER LOWER EXTREMITY VEINS, BILATERAL



INDICATION / CLINICAL INFORMATION:

left calf pain.



TECHNIQUE:

Duplex doppler imaging was performed through the veins of both lower extremities using venous jane
saima and other maneuvers.



COMPARISON: 

None available.



FINDINGS:

RIGHT COMMON FEMORAL VEIN: Negative.

RIGHT FEMORAL VEIN: Negative.

RIGHT POPLITEAL VEIN: Negative.

RIGHT CALF VEINS: Negative.



LEFT COMMON FEMORAL VEIN: Negative.

LEFT FEMORAL VEIN: Negative.

LEFT POPLITEAL VEIN: Negative.

LEFT CALF VEINS: Negative.



ADDITIONAL FINDINGS: None.



IMPRESSION:

1. No sonographic evidence for DVT in either lower extremity.



Signer Name: Kj Radford MD 

Signed: 8/29/2020 7:27 PM

Workstation Name: [id number]